# Patient Record
Sex: MALE | Race: WHITE | NOT HISPANIC OR LATINO | Employment: FULL TIME | URBAN - METROPOLITAN AREA
[De-identification: names, ages, dates, MRNs, and addresses within clinical notes are randomized per-mention and may not be internally consistent; named-entity substitution may affect disease eponyms.]

---

## 2021-09-15 LAB — SARS-COV-2 RNA RESP QL NAA+PROBE: NEGATIVE

## 2021-09-15 PROCEDURE — 87635 SARS-COV-2 COVID-19 AMP PRB: CPT | Performed by: EMERGENCY MEDICINE

## 2021-09-15 PROCEDURE — 99285 EMERGENCY DEPT VISIT HI MDM: CPT | Mod: 25

## 2021-09-15 PROCEDURE — C9803 HOPD COVID-19 SPEC COLLECT: HCPCS

## 2021-09-15 PROCEDURE — 90791 PSYCH DIAGNOSTIC EVALUATION: CPT

## 2021-09-16 ENCOUNTER — HOSPITAL ENCOUNTER (OUTPATIENT)
Facility: CLINIC | Age: 51
Setting detail: OBSERVATION
Discharge: HOME | End: 2021-09-16
Attending: EMERGENCY MEDICINE | Admitting: PSYCHIATRY & NEUROLOGY
Payer: COMMERCIAL

## 2021-09-16 VITALS
SYSTOLIC BLOOD PRESSURE: 118 MMHG | HEIGHT: 71 IN | OXYGEN SATURATION: 96 % | WEIGHT: 180.3 LBS | RESPIRATION RATE: 18 BRPM | DIASTOLIC BLOOD PRESSURE: 77 MMHG | TEMPERATURE: 98.2 F | HEART RATE: 62 BPM | BODY MASS INDEX: 25.24 KG/M2

## 2021-09-16 DIAGNOSIS — F51.01 PRIMARY INSOMNIA: ICD-10-CM

## 2021-09-16 DIAGNOSIS — F41.1 GAD (GENERALIZED ANXIETY DISORDER): ICD-10-CM

## 2021-09-16 LAB
CPB POCT: NO
GLUCOSE BLDC GLUCOMTR-MCNC: 105 MG/DL (ref 70–99)
HCO3 BLDV-SCNC: 30 MMOL/L (ref 21–28)
HCT VFR BLD CALC: 43 % (ref 40–53)
HGB BLD-MCNC: 14.6 G/DL (ref 13.3–17.7)
PCO2 BLDV: 50 MM HG (ref 40–50)
PH BLDV: 7.38 [PH] (ref 7.32–7.43)
PO2 BLDV: 23 MM HG (ref 25–47)
POTASSIUM BLD-SCNC: 3.8 MMOL/L (ref 3.4–5.3)
SAO2 % BLDV: 37 % (ref 94–100)
SODIUM BLD-SCNC: 141 MMOL/L (ref 133–144)

## 2021-09-16 PROCEDURE — G0378 HOSPITAL OBSERVATION PER HR: HCPCS

## 2021-09-16 PROCEDURE — 250N000013 HC RX MED GY IP 250 OP 250 PS 637: Performed by: PSYCHIATRY & NEUROLOGY

## 2021-09-16 PROCEDURE — 99236 HOSP IP/OBS SAME DATE HI 85: CPT | Performed by: PSYCHIATRY & NEUROLOGY

## 2021-09-16 PROCEDURE — 82803 BLOOD GASES ANY COMBINATION: CPT

## 2021-09-16 RX ORDER — ACETAMINOPHEN 500 MG
500 TABLET ORAL EVERY 6 HOURS PRN
Status: DISCONTINUED | OUTPATIENT
Start: 2021-09-16 | End: 2021-09-16 | Stop reason: HOSPADM

## 2021-09-16 RX ORDER — TRAZODONE HYDROCHLORIDE 100 MG/1
100 TABLET ORAL
Status: DISCONTINUED | OUTPATIENT
Start: 2021-09-16 | End: 2021-09-16 | Stop reason: HOSPADM

## 2021-09-16 RX ORDER — HYDROXYZINE PAMOATE 25 MG/1
25-50 CAPSULE ORAL 3 TIMES DAILY PRN
Qty: 30 CAPSULE | Refills: 0 | Status: SHIPPED | OUTPATIENT
Start: 2021-09-16

## 2021-09-16 RX ORDER — ESCITALOPRAM OXALATE 5 MG/1
5 TABLET ORAL DAILY
Status: DISCONTINUED | OUTPATIENT
Start: 2021-09-16 | End: 2021-09-16 | Stop reason: HOSPADM

## 2021-09-16 RX ORDER — ESCITALOPRAM OXALATE 10 MG/1
10 TABLET ORAL DAILY
Qty: 30 TABLET | Refills: 0 | Status: SHIPPED | OUTPATIENT
Start: 2021-09-16

## 2021-09-16 RX ORDER — HYDROXYZINE HYDROCHLORIDE 25 MG/1
25 TABLET, FILM COATED ORAL 3 TIMES DAILY PRN
Status: DISCONTINUED | OUTPATIENT
Start: 2021-09-16 | End: 2021-09-16 | Stop reason: HOSPADM

## 2021-09-16 RX ORDER — OLANZAPINE 5 MG/1
5 TABLET ORAL EVERY 6 HOURS PRN
Status: DISCONTINUED | OUTPATIENT
Start: 2021-09-16 | End: 2021-09-16 | Stop reason: HOSPADM

## 2021-09-16 RX ORDER — TRAZODONE HYDROCHLORIDE 50 MG/1
50 TABLET, FILM COATED ORAL
Qty: 30 TABLET | Refills: 0 | Status: SHIPPED | OUTPATIENT
Start: 2021-09-16

## 2021-09-16 RX ORDER — HYDROXYZINE HYDROCHLORIDE 50 MG/1
50 TABLET, FILM COATED ORAL 3 TIMES DAILY PRN
Status: DISCONTINUED | OUTPATIENT
Start: 2021-09-16 | End: 2021-09-16

## 2021-09-16 RX ADMIN — ESCITALOPRAM 5 MG: 5 TABLET, FILM COATED ORAL at 13:12

## 2021-09-16 RX ADMIN — HYDROXYZINE HYDROCHLORIDE 50 MG: 50 TABLET, FILM COATED ORAL at 01:28

## 2021-09-16 RX ADMIN — TRAZODONE HYDROCHLORIDE 100 MG: 100 TABLET ORAL at 01:28

## 2021-09-16 RX ADMIN — HYDROXYZINE HYDROCHLORIDE 25 MG: 25 TABLET ORAL at 13:12

## 2021-09-16 ASSESSMENT — ENCOUNTER SYMPTOMS
ABDOMINAL PAIN: 0
AGITATION: 1
NERVOUS/ANXIOUS: 1
VOMITING: 0
NUMBNESS: 1
COUGH: 0
FEVER: 0

## 2021-09-16 ASSESSMENT — ACTIVITIES OF DAILY LIVING (ADL)
LAUNDRY: WITH SUPERVISION
ORAL_HYGIENE: INDEPENDENT
HYGIENE/GROOMING: INDEPENDENT
DRESS: SCRUBS (BEHAVIORAL HEALTH)

## 2021-09-16 ASSESSMENT — MIFFLIN-ST. JEOR: SCORE: 1689.97

## 2021-09-16 NOTE — ED NOTES
"Collateral Information:    Dionne Archibald- [phone]    Spoke to Dionne, patient's adult daughter, who accompanied the patient in the ED.  Dionne states the patient has been experiencing increasing and debilitating anxiety with intrusive thoughts with SI.  Dionne reported the patient has told her he has thoughts of \"jerking his car off of the road\".  She states his increased anxiety is pervasive throughout his personal and work life.  Dionne describes this as the patient avoiding certain social situations \"he won't eat or go to the break room at work anymore because he is nervous he will say something crazy\" and \"my parents will have friends over for a BBQ and he will freak out and bolt without saying anything to anyone\".  Dionne reports these symptoms have have worsened and the patient obsesses about the intrusive thoughts \"all day, everyday\".  Dionne says the patient seems \"not mentally present\", he will say to her \"I'm lost. I feel very lost\".  Dionne also reported the patient has been experiencing sleep disturbances and feeling shaky with numbness in his hands, fingertips, and feet.  Dionne states her parents are , her mother is very cold to the patient and disregards his mental health concerns.  Dionne states her mother drinks often at night and is unable to assist or support the patient.  Dionne asked for a call back with an update later this morning, she is available and willing to  the patient around 11am.        Arti Powell, [facility]  [facility] Unit       "

## 2021-09-16 NOTE — PROGRESS NOTES
Patient agreed to discharge plan. Discharge instructions reviewed with patient including follow-up care plan. Medications: to be picked up at pharmacy Reviewed safety plan and outpatient resources. Denies SI and HI. All belongings that were brought into the hospital have been returned to patient. Escorted off the unit at 1340 accompanied by [facility] staff. Discharged to home via car with wife.

## 2021-09-16 NOTE — ED TRIAGE NOTES
Always had social anxiety, but 2 years ago, started feeling numb to everything and much more anxiety toward everything: hard to feel happy and/or excited.  His daughter states, when they go out to eat, he will often run from the table.  She states this has been an obsession, now, where he thinks about being anxious all day.  He states when he is driving, he has the urge to pull the wheel to make the car go off the road.  States he has SI thoughts sometimes, but he states he is not suicidal.

## 2021-09-16 NOTE — PROGRESS NOTES
Pt a/o c/o anxiety, and numbness that comes and goes in rt leg and foot. Has had it foe 2 wks did tell er drs about it and Psy was notified.Denies suicide ideation or harm to others.

## 2021-09-16 NOTE — ED PROVIDER NOTES
"  History     Chief Complaint:  Tingling and Anxiety      HPI   Calvin Archibald is a 51 year old male who has a history of anxiety presents with anxiety and tingling. His anxiety has significantly begun to worsen within the last month. He states that he has lost touch with himself, and that it is hard for him to feel happy or excited. He also has numbness, tingling and weakness.  He states that he has panic attacks that cause him to leave work, and make it unable for him to drive. He denies any specific thoughts of suicide, but wants his symptoms to stop. He has no cough, fever, or vomiting.  He does not currently see any therapists or psychiarists. He denies the use of tobacco and alcohol      Review of Systems   Constitutional: Negative for fever.   Respiratory: Negative for cough.    Cardiovascular: Negative for chest pain.   Gastrointestinal: Negative for abdominal pain and vomiting.   Neurological: Positive for numbness.   Psychiatric/Behavioral: Positive for agitation. The patient is nervous/anxious.    All other systems reviewed and are negative.      Allergies:  No Known Allergies        Past Medical History:    Elevated blood sugar  Hypertriglyceridemia      Social History:  Patient presents to the ED with daughter  Patient denies tobacco and alcohol use    Physical Exam     Patient Vitals for the past 24 hrs:   BP Temp Temp src Pulse Resp SpO2 Height Weight   09/16/21 0106 133/86 97.7  F (36.5  C) Oral 88 18 97 % -- --   09/16/21 0104 -- -- -- -- -- -- 1.803 m (5' 11\") 81.8 kg (180 lb 4.8 oz)   09/15/21 2218 (!) 141/82 97.7  F (36.5  C) Temporal 79 16 100 % -- --       Physical Exam  General: Appears well-developed and well-nourished.   Head: No signs of trauma.   CV: Normal rate and regular rhythm.    Resp: Effort normal and breath sounds normal. No respiratory distress.   GI: Soft. There is no tenderness.  No rebound or guarding.  Normal bowel sounds.    MSK: Normal range of motion. no edema. No Calf " tenderness.  Neuro: The patient is alert and oriented to person, place, and time.  PERRLA, EOMI, strength in upper/lower extremities normal and symmetrical. Sensation normal. Speech normal.  Skin: Skin is warm and dry. No rash noted.       Emergency Department Course     Laboratory:  Asymptomatic COVID-19 PCR test: Negative   Glucose by meter(Collection time 0049): 105 H  iStat Gases Electrolytes Venous: Bicarbonate 30 H, pO2 23 L, O2 saturation 37 L     Emergency Department Course:    Reviewed:  I reviewed nursing notes, vitals, past history and care everywhere    Assessments:  1219 I obtained history and examined the patient as noted above.     [facility]:  [facility]    Impression & Plan      Medical Decision Making:  Calvin Archibald is a 51 year old male who presents due to feeling anxiety. He states that he has been dealing with a degree of anxiety for some time, but it has gradually become worse and worse, to the point that it is making it difficult for him to function. The patient's daughter brought him into the hospital this evening.  Patient reports that he has been getting a tingling sensation throughout his body, particularly when he becomes anxious recently which has made him more concerned. On my evaluation he did appear somewhat anxious but was calm and cooperative.  The  remainder of his physical exam was reassuring.  I did obtain electrolytes and glucose which were appropriate.  Covid test was negative.  Patient was medically cleared and transferred to the [facility] unit for further psychiatric evaluation.      Covid-19  Calvin Archibald was evaluated during a global COVID-19 pandemic, which necessitated consideration that the patient might be at risk for infection with the SARS-CoV-2 virus that causes COVID-19.   Applicable protocols for evaluation were followed during the patient's care.   COVID-19 was considered as part of the patient's evaluation. The plan for testing is:  a test was  obtained during this visit. The test result was negative.    Diagnosis:    ICD-10-CM    1. SANDEE (generalized anxiety disorder)  F41.1        Scribe Disclosure:  Ilana MENDZE Hired, am serving as a scribe at 12:18 AM on 9/16/2021 to document services personally performed by Royer Brito MD based on my observations and the provider's statements to me.      Royer Brito MD  09/16/21 0549

## 2021-09-16 NOTE — ED NOTES
6813 - [id number], Met with Calvin to discuss safety plan, issues concerns and next step.  He is interested in getting started with therapy, though no therapy appointments close to home, we will check with the transition clinic.  He did want medication management, which was set up with [facility].  He is feeling safe and ready to discharge to home, though would like a dose of something to help him get home.          If I am feeling unsafe or I am in a crisis, I will:   Contact my established care providers   Call the National Suicide Prevention Lifeline: [phone]   Go to the nearest emergency room   Call 006          Warning signs that I or other people might notice when a crisis is developing for me:  He will let people know he is feeling that way, excessively withdrawn, avoiding contact     Things I am able to do on my own to cope or help me feel better:  Try to talk to someone, read, do some breathing, taking his medication, exercise - bike, workout    Things that I am able to do with others to cope or help me better:   Go for a walk, go out for coffee, talk/communicate     Things I can use or do for distraction:  Yard work, meditation, yoga    Changes I can make to support my mental health and wellness:  Taking your medication, try to get enough sleep, eat a healthy diet     People in my life that I can ask for help:  Daughter, friends and family he can talk to    Your Critical access hospital has a mental health crisis team you can call 24/7:  [facility] Crisis Line Number: [phone]  Text: [phone]    Dolly Lane MA, LMFT

## 2021-09-16 NOTE — CONSULTS
"9/15/2021  Calvin Archibald 1970     [facility] Mental Health Assessment:    Started at: 0700  Completed at: 0740  What type of assessment are you doing today? Crisis assessment    1.  Presenting Problem:      Referral Method to ED? Family/Friends     What brings the patient to the ED today? Pt is brought to ED by his adult daughter due to increased symptoms of anxiety. Pt has dealt with social anxiety most of his life and his anxiety has increased to other areas in the past 3 years with the past 3 months being most acute. The last month, the pt has been unable to go to work some days or needed to leave due to feelings of doom, being trapped and claustrophobia.     Pt also reports episodes of issues with balance about 3 times per week, numbness and tingling in his hands, arms and legs. He also reports involuntary tremors in his hands, dropping bolts and other items at work and difficulty finding words or expressing his thoughts. He is unable to distinguish if these things are more likely to happen when anxious or not as he feels he is always anxious at this time.     The pt has experienced intrusive thoughts over the past 3 years. He sometimes has thoughts of racially derogatory remarks and states he is not racist, his boss is black and he is terrified one of these thoughts will \"come out.\" He has also had thoughts of hitting a person while talking to them. No one in particular, just random people he may be talking to. He also has had thoughts of jerking the steering wheel on his car, he states this is not a suicidal thought, just intrusive and scary. Pt denies SI or any previous attempts but acknowledges feeling like he can not live like this. Pt denies HI/AH/VH.     Pt reports he has always had sleep issues which are more acute now. He can fall asleep but can not stay asleep, waking 2 - 4 hours into his sleep cycle with a startled and anxious feeling. He often has extreme night sweats when this happens. The pt has no " appetite issues, he enjoys food and reports no problems there.     The pt is open to medication and/or therapy depending on what his insurance will cover. He has been trying HTP supplement (amino acids for anxiety) over the past week with some, but very little success.     Has this happened before? Yes Pt has experienced Pt has expereinced social anxiety most of his life with it becoming owrse in the past 3 years. The past 2 -3 months has become more acute and his anxiety is a barrier to his functioning.     Duration of presenting problem: Current level of anxiety has been present for 2 -3 months with the past month being most acute.     Additional Stressors: Pt's wife is using alcohol to excess to deal with her own mental health.     2.  Risk Assessment:  Suicide and Self-Harm    ESS-6  1.a. Over the past 2 weeks, have you had thoughts of killing yourself? No   1.b. Have you ever attempted to kill yourself and, if yes, when did this last happen? No  2. Recent or current suicide plan? No  3. Recent or current intent to act on ideation? No  4. Lifetime psychiatric hospitalization? No  5. Pattern of excessive substance use? Yes  6. Current irritability, agitation, or aggression? No  ESS-6 Score: 1, low    SI: Passive  Plan: No  Intent: No   Prior Attempts: No     Protective Factors: Pt reports that his 4 children, 1 grandchild and his wife are all protective factors.     Hopes and goals for the future: Pt wants to enjoy watching his grandchild grow up and get back to enjoying previous things that brought him happiness in the past like watch his sons play sports and spend time with friends.     Coping Skills: What helps and doesn't help? Pt has used xanax in the recent past to address his anxiety. Historically he has used alcohol (3-5 drinks per day, most days) to help relax. He has not drank in the past week as he has been taking an amino acid supplement to address his symptoms.     Additional Risk Factors Related to  Safety and Suicide: No    Is the patient engaged in self injurious behaviors? No     Risk to Others    Aggressive/Assaultive/Homicidal Risk Factors: No     Duty to Warn? No     Was a Child Protection Report Made? No       Was a Adult Protection Report Made? No        Sexually inappropriate behavior? No        Vulnerability to sexual exploitation? No     Additional information:  Pt has never taken prescribed medication or therapy in the past.     3. Mental Health Symptoms and Substance Use  Current Symptoms and Mental Health History    GAIN Short Screener (GAIN-SS) administered? NA    Attention, Hyperactivity, and Impulsivity Symptoms      Patient reported symptoms related to hyperactivity, inattention, or impulsivity? No       Anxiety Symptoms    Patient reported anxiety symptoms? Yes: Panic attacks and Generalized Symptoms: Avoidance, Cognitive anxiety - feelings of doom, racing thoughts, difficulty concentrating , Excessive worry and Physiological anxiety - sweating, flushing, shaking, shortness of breath, or racing heart         Behavioral Difficulties    Patient reported behavioral difficulties? Yes: Withdrawal/Isolation       Mood Symptoms    Patient reported mood disorder symptoms? Yes: Feelings of helplessness , Feelings of hopelessness , Isolative , Loss of interest / Anhedonia , Low self esteem , Psychomotor agitation or retardation, Sad, depressed mood , Sleep disturbance , Somatic complaints and Thoughts of suicide/death        Eating Disorders and Appetite Disturbance      Patient reported appetite symptoms? No       Interpersonal Functioning     Patient reported difficulties that may be associated with personality and interpersonal functioning? Yes: Impaired Interpersonal Functioning      Learning Disabilities/Cognitive/Developmental Disorders    Patient reported concerns related to learning disabilities, cognitive challenges, and/or developmental disorders? No       General Cognitive  Impairments    Patient reported symptoms of cognitive impairments? No  If yes, complete Mini-Cog Assessment.    Sleep Disturbance    Patient reported difficulties with sleep? Yes: Difficulty staying sleep         Psychosis Symptoms    Patient reported symptoms of psychosis? No        Trauma and Post-Traumatic Stress Disorder    Physical Abuse: No   Emotional/Psychological Abuse: No  Sexual Abuse: No  Loss of a friend or family member to suicide: No  Other Traumatic Event: No     Patient reported trauma related symptoms? No       Impact of Mental Health on Functioning      Negative Impact Score: 8/10   Subjective Impact on functioning: Pt has been unable to work some days, is unable to enjoy his usual activities and can not participate in group setting. (i.e. lunchroom at work)  How do symptoms vary from baseline? While pt has experienced anxiety for many years, he has never been unable to function at work or with family.     Current and Historical Substance Use Note:    IIs there a history of, or current, substance use? Yes: Substance #1: Name(s): alcohol Frequency: most days Quantity: 3 - 5 drinks Duration: years Last use: 1 week ago      Have you been to chemical dependency treatment or detox before? No     CAGE-AID    Have you felt you ought to cut down on your drinking or drug use? No     Have people annoyed you by criticizing your drinking or drug use? No   Have you felt bad or guilty about your drinking or drug use? No  Have you ever had a drink or used drugs first thing in the morning to steady your nerves or to get rid of a hangover? No   CAGE-AID Score: 0/4    Drug screen completed? No   BAL/Breathalyzer completed? No       Mental Status Exam:    Affect: Blunted  Appearance: Appropriate   Attention Span/Concentration: Attentive    Eye Contact: Engaged  Fund of Knowledge: Appropriate   Language /Speech Content: Fluent  Language /Speech Volume: Normal   Language /Speech Rate/Productions: Slow   Recent Memory:  Intact  Remote Memory: Intact  Mood: Anxious and Sad   Orientation:   Person: Yes   Place: Yes  Time of Day: Yes   Date: Yes   Situation (Do they understand why they are here?): Yes   Psychomotor Behavior: Normal   Thought Content: Clear  Thought Form: Obsessive/Perseverative    4. Social and Environmental Conditions   Is the patient their own guardian? Yes    Living Situation: With others: wife, one adult son is sometimes in the home (20 y.o.)    Support system and quality of connections: Pt reports that his daughter is is closest support. She brought him here and gave collateral.     Income source: Employment: Works in [employer]    Issues with employment or education: Yes Pt has been unable to attend or needed to unexpectedly leave work due to his symptoms.     Legal Concerns  Do you have any history of or current involvement with the legal system? No    Spiritual and Cultural Influences  Do you have any Quaker beliefs that are important in your life? No     Do you have any cultural influences in your life that impact your mental health care? No        5. Psychiatric History, Medical History, and Current Care      Patient Mental Health Services   Does the patient have a history of mental health concerns/diagnoses? Yes Pt reports anxiety for many years but has not sought formal treatment in the past.      Current Providers  Primary Care Provider: Unknown   Psychiatrist: No   Therapist: No   : No   [facility]: No   ACT Team: No   Other: No    History of Commitment? No  History of Psychiatric Hospitalizations? No   History of programmatic care? No    Family Mental Health History   Family History of Mental Health or Chemical Dependency Issues? No     Development and Physical Health Challenges  Delays or concerns meeting developmental milestones? No  Current psychotropic medications? No   Medication Compliant? NA   Recent medication changes? NA    History of concussion or TBI? No     Additional  "Information: N/A    6. Collateral Information and Collaboration    Collaboration with medical staff:Referral Information:   Nursing     Collateral Information/Sources: Wife, Dayna [phone]     Family: Meagan Archibald- [phone]     Spoke to Dionne, patient's adult daughter, who accompanied the patient in the ED.  Dionne states the patient has been experiencing increasing and debilitating anxiety with intrusive thoughts with SI.  Dionne reported the patient has told her he has thoughts of \"jerking his car off of the road\".  She states his increased anxiety is pervasive throughout his personal and work life.  Dionne describes this as the patient avoiding certain social situations \"he won't eat or go to the break room at work anymore because he is nervous he will say something crazy\" and \"my parents will have friends over for a BBQ and he will freak out and bolt without saying anything to anyone\".  Dionne reports these symptoms have have worsened and the patient obsesses about the intrusive thoughts \"all day, everyday\".  Dionne says the patient seems \"not mentally present\", he will say to her \"I'm lost. I feel very lost\".  Dionne also reported the patient has been experiencing sleep disturbances and feeling shaky with numbness in his hands, fingertips, and feet.  Dionne states her parents are , her mother is very cold to the patient and disregards his mental health concerns.  Dionne states her mother drinks often at night and is unable to assist or support the patient.  Dionne asked for a call back with an update later this morning, she is available and willing to  the patient around 11am.          Arti Powell, [facility]  [facility] Unit         7. Assessment and Diagnosis  Assessment of patient strengths and vulnerabilities    Strengths, Protective Factors, & Community Resources: Pt finds his daughter to be a very supportive factor in his life. Pt normally finds watching sports to be an effective distraction " but that is not the case currently. Pt is employed and has insurance, he also is open to treatment by medication and/or therapy.     Patient skills, abilities, and coping skills (what is going well?): Pt is unable to cope with symptoms currently.     Patient vulnerabilities: Pt has engaged in alcohol use in the past to deal with his anxiety. Pt is unable to cope with intrusive thoughts of racial remarks, driving car of the road or hitting others when speaking to them. He has not acted upon these things and does not want to but finds the thoughts debilitating.     Diagnosis      1 Generalized anxiety disorder F41.1      2 Major depressive disorder, Recurrent episode, Moderate F33.1      3 Alcohol use disorder, Mild    8.Therapeutic Methodologies Utilized in Assessment    Psychotherapy techniques and/or interventions used: Establishing rapport, Active listening, Assess dimensions of crisis and Brief Supportive Therapy    9. Patient Care/Treatment Plan  Summary of Patient Presentation and needs  What are the basic needs for this patient in this moment? Pt needs to find ways to address his anxiety symptoms that are barriers to his daily functioning.       Consultations :  Attending provider consulted? Yes  Attending Name: Dr Tomas Fletcher   Attending concurs with disposition? Yes     Recommended disposition: Medications will be started and pt should follow up with PCP, which he needs to establish.     Does the patient agree with the recommended level of care? Yes    Final disposition: Other: Pending    Disposition Details: Pending    If Inpatient, is patient admitted voluntary? N/A   Patient aware of potential for transfer if there is not appropriate placement? NA  [facility]   Central Intake Notified? NA    10. Patient Care Document: Safety and After Care Planning:          Safety Plan Provided? No, safety plan will be created prior to discharge.    Follow-Up  Plans and Providers: Pending    Follow-Up Plan:  After care plan provided to the patient/guardian by: Pending  After care plan provided to any additional sources/parties? No    Duration of face to face time with patient in minutes: .75 hrs    CPT code(s) utilized: 67272 - Psychotherapy for Crisis - 60 (30-74*) min      Mervat Lopez, LICSW

## 2021-09-16 NOTE — PROGRESS NOTES
Per ED notes: Always had social anxiety, but 2 years ago, started feeling numb to everything and much more anxiety toward everything: hard to feel happy and/or excited.  His daughter states, when they go out to eat, he will often run from the table.  She states this has been an obsession, now, where he thinks about being anxious all day.  He states when he is driving, he has the urge to pull the wheel to make the car go off the road.  States he has SI thoughts sometimes, but he states he is not suicidal. Upon Arrival to [facility] pt is visibly anxious. Pt is calm and cooperative. Pt requested some medication to help him with his anxiety. Pt received  50mg Atarax.

## 2021-09-16 NOTE — ED PROVIDER NOTES
EmPATH Unit - Psychiatry  Combined Observation Note and Discharge Summary  [facility] Emergency Department  Observation Initiation Date: Sep 15, 2021    Calvin Archibald MRN: [id number]   Age: 51 year old YOB: 1970     History     Chief Complaint   Patient presents with     Tingling     Anxiety     HPI  Calvin Archibald is a 51 year old male who presents to the emergency room seeking a physical and mental health evaluation.  Records indicate a prominent concern for symptoms of anxiety as well as somatic aberrations involving numbness, tingling, and occasional weakness.  Once determined to be medically stable, he was transferred to the empath unit for psychiatric assessment.  On examination, the patient explains that over the past few weeks, he has been experiencing progressively worsening anxiety.  This is characterized as excessive worries leading to moments of shakiness, shortness of breath, palpitations, and weakness.  His sleep is overall decreased, noting early morning wakings and difficulty falling back asleep.  Concentration has been limited.  His energy has been declining.  His appetite has been consistent and his weight has remained stable.  He denied suicidal or homicidal thoughts.  No indication of psychosis.  No correlation with alcohol or substance usage.  He explains that he was seeking reassurance of medical stability as well as wanting to explore mental health related treatments.  He has been taking an over-the-counter supplement to aid with the symptoms however has not been gaining sustained or meaningful benefit.  He recalls one daughter that is also being treated for anxiety however he is not certain of what medication she has taken or whether or not she is still taking the medication.  No other family history of mental illness reported.      Past Medical History  History reviewed. No pertinent past medical history.  History reviewed. No pertinent surgical history.  escitalopram (LEXAPRO) 10  "MG tablet  hydrOXYzine (VISTARIL) 25 MG capsule  traZODone (DESYREL) 50 MG tablet      No Known Allergies  Family History  History reviewed. No pertinent family history.  Social History   Social History     Tobacco Use     Smoking status: Former Smoker   Substance Use Topics     Alcohol use: Yes     Comment: No alcohol for a week     Drug use: Never      Past medical history, past surgical history, medications, allergies, family history, and social history were reviewed with the patient. No additional pertinent items.       Review of Systems  A complete review of systems was performed with pertinent positives and negatives noted in the HPI, and all other systems negative.    Physical Examination   BP: (!) 141/82  Pulse: 79  Temp: 97.7  F (36.5  C)  Resp: 16  Height: 180.3 cm (5' 11\")  Weight: 81.8 kg (180 lb 4.8 oz)  SpO2: 100 %    Physical Exam  General: Appears stated age.   Neuro: Alert and fully oriented. Extremities appear to demonstrate normal strength on visual inspection.   Integumentary/Skin: no rash visualized, normal color    Psychiatric Examination   Appearance: awake, alert  Attitude:  cooperative  Eye Contact:  fair  Mood:  anxious  Affect:  mood congruent  Speech:  clear, coherent  Psychomotor Behavior:  no evidence of tardive dyskinesia, dystonia, or tics  Thought Process:  logical and linear  Associations:  no loose associations  Thought Content:  no evidence of suicidal ideation or homicidal ideation and no evidence of psychotic thought  Insight:  fair  Judgement:  intact  Oriented to:  time, person, and place  Attention Span and Concentration:  intact  Recent and Remote Memory:  intact  Language: able to name/identify objects without impairment  Fund of Knowledge: intact with awareness of current and past events    ED Course        Labs Ordered and Resulted from Time of ED Arrival Up to the Time of Departure from the ED   ISTAT GASES ELECTROLYTES VENOUS POCT - Abnormal; Notable for the following " components:       Result Value    Bicarbonate Venous POCT 30 (*)     pO2 Venous POCT 23 (*)     O2 Sat, Venous POCT 37 (*)     All other components within normal limits   GLUCOSE BY METER - Abnormal; Notable for the following components:    GLUCOSE BY METER POCT 105 (*)     All other components within normal limits   COVID-19 VIRUS (CORONAVIRUS) BY PCR - Normal    Narrative:     Testing was performed using the [employer]  SARS-CoV-2 & Influenza A/B Assay on the [employer]  L[employer]  System.  This test should be ordered for the detection of SARS-COV-2 in individuals who meet SARS-CoV-2 clinical and/or epidemiological criteria. Test performance is unknown in asymptomatic patients.  This test is for in vitro diagnostic use under the FDA EUA for laboratories certified under CLIA to perform moderate and/or high complexity testing. This test has not been FDA cleared or approved.  A negative test does not rule out the presence of PCR inhibitors in the specimen or target RNA in concentration below the limit of detection for the assay. The possibility of a false negative should be considered if the patient's recent exposure or clinical presentation suggests COVID-19.  [facility] are certified under the Clinical Laboratory Improvement Amendments of 1988 (CLIA-88) as qualified to perform moderate and/or high complexity laboratory testing.   GLUCOSE MONITOR NURSING POCT   ISTAT GAS OR ELECTROLYTE NURSING POCT   URINE DRUGS OF ABUSE SCREEN       Assessments & Plan (with Medical Decision Making)   Patient presenting with . Nursing notes reviewed noting no acute issues.     I have reviewed the assessment completed by the [facility].     During the observation period, the patient did not require medications for agitation, and did not require restraints/seclusion for patient and/or provider safety.    The patient was found to have a psychiatric condition that would benefit from an observation stay in the emergency department for  further psychiatric stabilization and/or coordination of a safe disposition. The observation plan includes serial assessments of psychiatric condition, potential administration of medications if indicated, further disposition pending the patient's psychiatric course during the monitoring period.     Preliminary diagnosis:    ICD-10-CM    1. SANDEE (generalized anxiety disorder)  F41.1    2. Primary insomnia  F51.01         Treatment Plan:  -Begin Lexapro 10 mg daily targeting symptoms of a generalized anxiety disorder.  Until the medication takes full effect, Vistaril will be available as needed for anxiety at a dose of 25-50 mg 3 times a day.  To aid with insomnia, trazodone will also be provided at a dose of 50 mg at bedtime as needed.  The patient was educated regarding his diagnosis and medication plan including potential side effects that may occur.  -The patient will follow-up with his primary care provider for medication management.  -Resources will be provided for individual psychotherapy  -Discharge home today    After the period in observation care, the patient's circumstances and mental state were safe for outpatient management. After counseling on the diagnosis, work-up, and treatment plan, the patient was discharged. Close follow-up with a psychiatrist and/or therapist was recommended and community psychiatric resources were provided. Patient is to return to the ED if any urgent or potentially life-threatening concerns.      At the time of discharge, the patient's acute suicide risk was determined to be low due to the following factors: Reduction in the intensity of mood/anxiety symptoms that preceded the admission, denial of suicidal thoughts, denies feeling helpless or helpless, not currently under the influence of alcohol or illicit substances, denies experiencing command hallucinations, no immediate access to firearms. The patient's acute risk could be higher if noncompliant with their treatment plan,  medications, follow-up appointments or using illicit substances or alcohol. Protective factors include: social supports, children, stable housing, employment    --  Tomas Fletcher MD   [facility] EMERGENCY DEPT  EmPATH Unit  9/15/2021         Tomas Fletcher MD  09/16/21 0945

## 2021-09-16 NOTE — DISCHARGE INSTRUCTIONS
Medication Management Appointment:  Davy Faustin  MSN  CNP,PMHNP,RN, [facility]  [address], [address] [phone] 9/29/2021 2:00 pm  Therapy Virtual Appointment:  Gopal Faustin  MSW  [facility]  [address], [address] [phone] 9/23/2021 6:00 pm  Medical Appointment:  Dr. Contreras [facility]: [address] [place] [id number] [phone] 9/27/2021 11:20 am  Please watch your email to complete paperwork before your appointments.  If you need to cancel for any reason, please call.        If I am feeling unsafe or I am in a crisis, I will:   Contact my established care providers   Call the National Suicide Prevention Lifeline: 627.532.5216   Go to the nearest emergency room   Call [phone]        Warning signs that I or other people might notice when a crisis is developing for me:  He will let people know he is feeling that way, excessively withdrawn, avoiding contact      Things I am able to do on my own to cope or help me feel better:  Try to talk to someone, read, do some breathing, taking his medication, exercise - bike, workout     Things that I am able to do with others to cope or help me better:   Go for a walk, go out for coffee, talk/communicate      Things I can use or do for distraction:  Yard work, meditation, yoga     Changes I can make to support my mental health and wellness:  Taking your medication, try to get enough sleep, eat a healthy diet      People in my life that I can ask for help:  Daughter, friends and family he can talk to     Your county has a mental health crisis team you can call 24/7:  [facility] Crisis Line Number: [phone]  Text: [place] 594219    Crisis Lines  Crisis Text Line  Text 934950  You will be connected with a trained live crisis counselor to provide support.    Gambling Hotline  1.800.333.hope [4673]    National Hope Line  1.800.SUICIDE [9887811]    National Suicide Prevention  "Lifeline  Free and confidential support  1.277.494.TALK [9351]  http://suicidepreventionlifeline.org    [employer] (LGBTQ Youth Crisis Line)  1.392.824.5908  text START to 182-054    's Crisis Line  2.327.841.3342 (Press 1)  or text 317806    [facility] Mental Health Crisis Response  Within [place], call **CRISIS [**563337] to be connected to a mental health professional who can assist you.      [facility] Crisis  [phone]    [facility] Mobile Crisis  [phone]    [facility] Crisis  [phone]    [facility] Mobile Crisis  [phone] (adults)  [phone] (children)    [facility] Crisis  [phone] (adults)  [phone] (children)    [facility] Mobile Crisis  [phone]    [facility] Mobile Crisis  [phone]    Community Resources  Fast Tracker  Linking people to mental health and substance use disorder resources  PowerPottrackMeteo Protectn.org     [place] Mental Health Warm Line  Peer to peer support  Monday thru Saturday, 12 pm to 10 pm  495.980.3678 or 8.032.555.9947  Text \"Support\" to 49128    National [place] on Mental Illness (LUNA)  434.779.9420 or 1.888.LUNA.HELPS    [facility]  [facility] residents only   [address] [facility]  [phone]    Walk-in Counseling Center  Free mental health counseling  [address] [facility]  [phone]    Mental Health Apps  My3  https://my[employer].pp.org/    VirtualHopeBox  https://Phase [employer].org/apps/virtual-hope-box/    Suicide Safety Plan (Smart Voicemail)    Calm Harm      Why We Don't Prescribe Opioids and Benzodiazepines Together  Helping You Take Your Medicines Safely  What are the dangers of mixing opioids and benzodiazepines (BZDs)?  It's very risky to mix opioids and benzodiazepines. Both medicines can make your brain work slower. The risks of taking these medicines together include:    Feeling overly sleepy or " drowsy    Getting hurt by accident    Slowed breathing or trouble breathing    Coma    Accidental overdose    Death  If you are taking both these medicines, your provider will safely taper you off one of them. We will help you find other, safer medicines.  How can I make taking these drugs safer?    Don't drink alcohol while taking these medicines. Wine, beer and liquor can raise your risk of deadly side effects.    Don't drive or use heavy machinery until you know how these medicines affect you.    Tell your healthcare providers about all the medicines you're taking. Include over-the-counter (OTC) medicines, like cold and allergy pills.    Keep a list of all your medicines where you can find it quickly.  What are opioids?  Opioids (QR-ypz-nfzjp) are powerful medicines for pain. They can cause problems even when you use them right. Using opioids also increases your risk of injury, addiction, overdose and death.   Examples of opioids    Hydrocodone (Vicodin, Norco, Lortab)    Oxycodone (Percocet)    Morphine    Fentanyl    Methadone    Tramadol    Buprenorphine  Common side effects of opioids    Feeling drowsy or dizzy    Upset stomach (nausea)    Throwing up (vomiting)    Hard stools (constipation)    Mood problems    Slowed breathing or trouble breathing  What are benzodiazepines?  Benzodiazepines (carolina-zoh-die-AZ-uh-peenz), or BZDs, are used to treat seizures, muscle spasm, anxiety and sleeplessness (insomnia). BZDs can cause many problems, including drug abuse.   Examples of benzodiazepines    Alprazolam (Xanax)    Clonazepam (Klonopin)    Diazepam (Valium)    Lorazepam (Ativan)    Temazepam (Restoril)    Zolpidem (Ambien)  Common side effects of benzodiazepines    Feeling drowsy or dizzy    Weakness    Trouble thinking    Mood problems  Signs of an overdose  Call 911 right away for any of these symptoms:    Face is very pale or feels clammy    Body is limp    Fingernails or lips look blue or purple    Throwing up  "or making gurgling noises    Won't wake up    Can't talk    Breathing or heartbeat is slow or stopped   What can I do to prevent an overdose?  1. Only take medicine prescribed to you by your doctor.  2. Take your medicine exactly as prescribed. Don't take more medicine, and don't take it more often than your doctor said to.  3. NEVER mix pain medicines with alcohol, sleeping pills or other drugs.  4. Store medicines in a safe place where children and pets can't reach them.  5. Ask your pharmacist the right way to get rid of unused medicines.  6. Learn about the signs and symptoms of overdose. Overdose is a life-or-death emergency.  7. Ask your provider about using naloxone.  What is naloxone and how can it help me?  Naloxone (nuh-LOX-ohn) is a very important medicine that can make it safer to use opioids.   Naloxone can reverse the effects of an opioid overdose. But you need to take it the right way at the right time.  Naloxone won't treat benzodiazepine overdose. But naloxone can help if opioids were taken together with BZDs, sleeping pills, or stimulants (\"uppers\").  Ask your provider about naloxone if you are taking opioids.     For informational purposes only. Not to replace the advice of your health care provider. Copyright   2017 [facility]. All rights reserved. [employer] 433023 - 03/17.      Treating Anxiety Disorders with Therapy    If you have an anxiety disorder, you don t have to suffer needlessly. Treatment is available. Therapy (also called counseling) is often a helpful treatment for anxiety disorders. With therapy, a trained professional (therapist) helps you face and learn to manage your anxiety. Therapy can be short-term or long-term based on your needs. In some cases, medicine may also be prescribed with therapy. It may take time before you notice how much therapy is helping, but stick with it. With therapy, you can feel better.   Cognitive behavioral therapy " (CBT)  Cognitive behavioral therapy (CBT) teaches you to manage anxiety. It does this by helping you understand how you think and act when you re anxious. Research has shown CBT to be a very effective treatment for anxiety disorders. CBT involves homework and activities to build skills that teach you to cope with anxiety step by step. It can be done in a group or 1-on-1, and often takes place for a set number of sessions. CBT has 2 main parts:     Cognitive therapy. This helps you identify the negative, irrational thoughts that occur with your anxiety. You ll learn to replace these with more positive, realistic thoughts.    Behavioral therapy. This helps you change how you react to anxiety. You ll learn coping skills and methods for relaxing to help you better deal with anxiety.  Other forms of therapy  Other therapy methods may work better for you than CBT. Or, you may move from CBT to another form of therapy as your treatment needs change. This may mean meeting with a therapist by yourself or in a group. Therapy can also help you work through problems in your life, such as drug or alcohol dependence, that may be making your anxiety worse.   Getting better takes time  Therapy will help you feel better and teach you skills to help manage anxiety long term. But change doesn t happen right away. It takes a commitment from you. And treatment only works if you learn to face the causes of your anxiety. So, you might feel worse before you feel better. This can sometimes make it hard to stick with it. But remember: Therapy is a very effective treatment. The results will be well worth it.   Helping yourself  If anxiety is wearing you down, here are some things you can do to cope:    Check with your healthcare provider and rule out any physical problems that may be causing the anxiety symptoms.    If you are diagnosed with an anxiety disorder, seek mental healthcare. This is an illness and it can respond to treatment. Most  types of anxiety disorders will respond to talk therapy and medicine.    Educate yourself about anxiety disorders. Keep track of helpful online resources and books you can use during stressful periods.    Try stress management methods such as meditation.    Consider online or in-person support groups.    Don t fight your feelings. Anxiety feeds itself. The more you worry about it, the worse it gets. Instead, try to identify what might have triggered your anxiety. Then try to put this threat in perspective.    Keep in mind that you can t control everything about a situation. Change what you can and let the rest take its course.    Exercise -- it s a great way to relieve tension and help your body feel relaxed.    Examine your life for stress, and try to find ways to reduce it.    Avoid caffeine and nicotine. These can make anxiety symptoms worse.    Don't turn to alcohol or unprescribed medicines for relief. They only make things worse in the long run.  [employer] last reviewed this educational content on 5/1/2020 2000-2021 [employer]. All rights reserved. This information is not intended as a substitute for professional medical care. Always follow your healthcare professional's instructions.          Life After Combat: Coping with an Anxiety Disorder   In combat, you were under a lot of stress. You were in a strange place. You were away from your home and family. You lived with the constant threat of danger, violence, and personal harm. You may have been asked to do things that challenged you in unexpected ways.  culture can be demanding, with long days, short nights, and little room for emotion. Being brave is standard practice. Anxiety is a healthy response to stress like this. But too much anxiety can be a problem. It may start kicking in at the wrong time. Or it may never go away. This causes physical and emotional discomfort. For some people anxiety can become so severe that it causes  problems in daily life, at work or school, and in relationships. When anxiety becomes such a big part of your life, it s an anxiety disorder.      Treatment can help you get your anxiety disorder under control. Talk to your healthcare provider about the best treatment options for you.    How anxiety helps keep you safe  Anxiety is an extreme form of worry and stress. When you re threatened, there s rarely time to decide how to react. Instead, the body s automatic defenses kick in. Your body is flooded with anxiety hormones. This causes physical and emotional responses. Your heart pumps harder. Your muscles tense. You tremble or sweat. Emotionally, you feel intense worry, fear, or dread. These sensations and emotions alert you to danger and help you react quickly. In response to a threat, anxiety prompts you to do what is needed to protect yourself.   When anxiety becomes a problem  In a war zone you re on high alert. Even if there is no immediate threat, danger can present itself at any time. You may be worried for your own safety and the safety of others. It s natural to experience anxiety in this situation. But the anxious feeling may continue after you leave the war zone. Even back at home, you may be on constant alert. Or extreme anxiety may pop up over minor issues. This unexplained anxiety makes it hard to live and enjoy your daily life. If this sounds like what s happening to you, you may have an anxiety disorder. Someone with an anxiety disorder may:     Have panic attacks. A panic attack is an abrupt, intense anxiety response. It includes extreme anxiety, severe physical symptoms (like a pounding heart or difficulty breathing), and a strong desire to escape. You may suddenly feel closed in or all alone, even if you re in an open, public place. Most panic attacks start suddenly, for no clear reason. The attack can last around 5 to 20 minutes. During an attack, you may think that you re having a heart attack,  going crazy, or dying.    Feel anxious all the time. You might worry about money, your family and friends, work, war, or the world in general. You might not even be sure what you re anxious about. Whatever the cause of your worry, you have a strong fear that the worst will happen. This generalized anxiety affects your quality of life and makes it hard to function.    Have intense anxiety in certain situations. For example, you may fear spending time in the dark or in enclosed spaces. These fears (sometimes called phobias) may relate to something that happened in combat. Or they may not. To escape the anxiety, you may try to avoid the situation that prompts it. Such avoidance can have a serious impact on your life.    Feel a strong need to act on anxiety-provoking thoughts. Sometimes an anxiety disorder makes certain unwanted thoughts invade your mind. You know the thoughts are irrational. But you still feel a compulsive need to act on them, often in unhealthy ways. For example, you may constantly check your doors to make sure they re locked. Or you might walk a perimeter around your house to make sure no one is watching. Doing so helps ease the anxiety for a short time. But it can become very disruptive to daily life.  Symptoms of anxiety disorders  Anxiety makes you feel worried and fearful. It can also cause physical symptoms. In fact, many people with anxiety disorders first go to the healthcare provider to get checked for a physical problem. Symptoms can include:     A pounding or racing heartbeat    Shortness of breath    Chest pain    Dizziness or lightheadedness    Restlessness or problems sleeping    Muscle tension, especially in the neck and shoulders    Nausea or stomach problems    Headaches    Very fast speech    Trembling    Fear    Feeling irritable or on edge all the time    Trouble concentrating  Treatment will help you get your life back   You may think that asking for help is a sign of weakness. In  fact, taking action to make your life better takes a lot of courage. With the right treatment, most people learn to manage anxiety. Your treatment may include talk therapy (counseling), a process during which you talk about your anxiety and related problems with a trained professional. Medicines may also be prescribed. For many people with anxiety disorders, treatment includes both medicine and counseling. Consider the following:     Check with your healthcare provider and rule out any physical problems that may be causing the anxiety symptoms.    If an anxiety disorder is diagnosed, get mental healthcare. This is an illness and it can respond to treatment. Most types of anxiety disorders will respond to talk therapy and medicine.    Counseling includes working with a trained professional to better understand your anxiety and learn skills to manage it. Many types of counseling have been shown to be effective treatments for anxiety disorders. In counseling, you ll likely learn new ways of responding to thoughts, feelings, and memories that make you anxious. This can improve your symptoms and help change how you react to situations that make you anxious. Counseling could be done one-on-one. Or you may have group therapy with other Vets who have been in combat.    Medicine may be prescribed. Some medicines are used only for a short time, to treat immediate symptoms. Others are taken long-term, to help improve your mood over time. At first, medicines and dosages may need to be adjusted. Tell your healthcare provider how a medicine affects you. This way you can work together to find what works best for you. Be aware that a few weeks may pass before you see a medicine s full impact on your mood. If you don t notice a change at first, you may simply need more time. But if you don t notice results after the first few weeks, tell your provider.    Educate yourself about anxiety disorders. Keep track of helpful online  resources and books you can use during stressful periods.    Try stress management methods such as meditation.    Think about joining online or in-person support groups.  Moving forward  An anxiety disorder affects your emotions, health, and life. Be assured that treatment will help you get better. Taking the first step can be hard. But once you start treatment and see how much better life can be, you ll be glad you did.   To learn more, talk with your healthcare provider or your [facility] mental health coordinator.   You can also visit:     [place] [facility] website at www.mentalhealth.[place].gov/anxiety/    Anxiety and Depression Association of [place] at adaa.org/    [employer] last reviewed this educational content on 4/1/2020 2000-2021 [employer]. All rights reserved. This information is not intended as a substitute for professional medical care. Always follow your healthcare professional's instructions.          Improving Sleep After Traumatic Brain Injury  A traumatic brain injury (TBI) is a sudden jolt to your head that causes your brain to change the way it works. This can happen from a blow to your head, a blast, a sudden movement of your head that bounces your brain inside your skull, or a bullet or fragment entering your brain. Falls, fights, combat, sports, and motor vehicle accidents are other common causes. TBI is more common in men than women and more common in those younger than 25 years old.   TBI can cause many brain changes. Because everybody s brain is different, your symptoms may be different from those of other people. Symptoms can include changes in the way you feel, act, think, and move. Having trouble sleeping is one symptom that affects many people with TBI. Studies show that about 60% of people with TBI have this problem.  Why TBI causes sleep problems   Having a TBI may cause a sleep problem for various reasons including direct trauma to  the sleep areas in the brain, changes in melatonin, a brain hormone involved in regulating sleep, and other injury types. Other problems and some common TBI symptoms also can make sleep more difficult. These include:    Abuse of drugs and alcohol    Mental health problems, such as depression and anxiety    Daytime sleepiness (because you may end up napping during the day)    Headache and other types of pain  Types of sleep problems with TBI  If you are recovering from TBI, you may:    Have trouble falling asleep    Have trouble staying asleep    Wake up often and easily (you re a  light sleeper )    Not be able to fall back asleep    Have trouble getting enough oxygen while sleeping (called sleep apnea)    Develop narcolepsy, which is falling asleep suddenly and uncontrollably     Develop sleepwalking, which is walking or moving while asleep with no awareness of the action   Why sleep is important for TBI recovery   Your brain needs sleep to recover from a TBI. Not getting enough sleep can make many other TBI symptoms worse. These symptoms include:    Fatigue    Mental confusion    Pain    Depression    Anxiety    Mood swings    Memory problems   Help for TBI sleep problems  Insomnia after TBI can persist. It may be accompanied by depression and trouble functioning during the day. If your symptoms get worse or last for more than a few weeks, reach out to your primary care healthcare provider or specialists for help. Behavioral therapy and medicines to keep you awake during the day or antidepressants are very helpful in some.  In most people, medicines that help you sleep are rarely the answer for sleep problems that a TBI causes. Many sleep medicines, including over-the-counter drugs, can make TBI worse. Don't take any sleep medicines or aids before checking with your healthcare provider.  The best way to treat TBI-related sleep problems is with what is called good sleep hygiene. That means:    Going to bed and  getting up at the same time every day, including weekends    Avoiding caffeine, alcohol, and nicotine    Getting some exercise and sunshine every day to help reset your internal clock    Resting during the day, but not napping for more than 20 minutes    Avoiding heavy exercise and heavy meals for several hours before bedtime    Keeping your bedroom quiet, dark, and at a comfortable temperature    Keep stress and work out of the bedroom     Not watching TV or working on your computer while in bed    Not lying awake in bed; get up and do a relaxing activity for a short while  Sleep problems are common after a TBI. If good sleep hygiene is not solving your sleep problems, talk with your healthcare provider. You may need to learn some relaxation techniques or try talk therapy to help you through a mental health problem like depression or anxiety.    Sleeping well is one of the best ways to help your brain recover. Do everything you can to get the rest you need.  [employer] last reviewed this educational content on 1/1/2018 2000-2021 [employer]. All rights reserved. This information is not intended as a substitute for professional medical care. Always follow your healthcare professional's instructions.          Treating Anxiety Disorders with Medicine  An anxiety disorder can make you feel nervous or apprehensive, even without a clear reason. In people age 65 and older, generalized anxiety disorder is one of the most commonly diagnosed anxiety disorders. Many times it occurs with depression. Certain anxiety disorders can cause intense feelings of fear or panic. You may even have physical symptoms such as a racing heartbeat, sweating, or dizziness. If you have these feelings, you don t have to suffer anymore. Treatment to help you overcome your fears will likely include therapy (also called counseling). Medicine may also be prescribed to help control your symptoms.     Medicines  Certain medicines may be  prescribed to help control your symptoms. So you may feel less anxious. You may also feel able to move forward with therapy. At first, medicines and dosages may need to be adjusted to find what works best for you. Try to be patient. Tell your healthcare provider how a medicine makes you feel. This way, you can work together to find the treatment that s best for you. Keep in mind that medicines can have side effects. Talk with your provider about any side effects that are bothering you. Changing the dose or type of medicine may help. Don t stop taking medicine on your own. That can cause symptoms to come back or cause dangerous withdrawal symptoms.     Anti-anxiety medicine. This medicine eases symptoms and helps you relax. Your healthcare provider will explain when and how to use it. It may be prescribed for use before situations that make you anxious. You may also be told to take medicine on a regular schedule. Anti-anxiety medicine may make you feel a little sleepy or  out of it.  Don t drive a car or operate machinery while on this medicine, until you know how it affects you.  Never use alcohol or other drugs with anti-anxiety medicines. This could result in loss of muscular control, sedation, coma, or death. Also, use only the amount of medicine prescribed for you. If you think you may have taken too much, get emergency care right away. Never share your medicines with others. Store these medicines in a safe place that can't be reached by children or visitors.   Keep taking medicines as prescribed  Never change your dosage, share or use another person's medicine, or stop taking your medicines without talking to your healthcare provider first. Keep the following in mind:     Some medicines must be taken on a schedule. Make this part of your daily routine. For instance, always take your pill before brushing your teeth. A pillbox can help you remember if you ve taken your medicine each day.    Medicines are often  taken for 6 to 12 months. Your healthcare provider will then evaluate whether you need to stay on them. Many people who have also had therapy may no longer need medicine to manage anxiety.    You may need to stop taking medicine slowly to give your body time to adjust. When it s time to stop, your healthcare provider will tell you more. Remember: Never stop taking your medicine without talking to your provider first.    If symptoms return, you may need to start taking medicines again.  This isn t your fault. It s just the nature of your anxiety disorder.  What to think about    Side effects. Medicines may cause side effects. Ask your healthcare provider or pharmacist what you can expect. They may have ideas for avoiding some side effects.    Sexual problems. Some antidepressants can affect your desire for sex or your ability to have an orgasm. A change in dosage or medicine often solves the problem. If you have a sexual side effect that concerns you, tell your healthcare provider.    Addiction. If you ve never had a problem with drugs or alcohol, you may not have a problem with medicines used to treat anxiety disorders. But always discuss the medicines with your healthcare provider before taking them. If you have a history of addiction, you may not be able to use certain medicines used to treat anxiety disorders.    Medicine interactions. Always check with your pharmacist before using any over-the-counter medicines (OTCs), including herbal supplements. Some OTCs may interact with your anti-anxiety medicines and increase or decrease their effectiveness.    Paty last reviewed this educational content on 12/1/2019 2000-2021 [employer]. All rights reserved. This information is not intended as a substitute for professional medical care. Always follow your healthcare professional's instructions.          Anxiety Reaction  Anxiety is the feeling we all get when we think something bad might happen. It  is a normal response to stress and normally causes only a mild reaction. When anxiety becomes more severe, it can interfere with daily life. In some cases, you may not even be aware of what you re anxious about. There may also be a genetic link. Or it may be a learned behavior in the home.   Both psychological and physical triggers cause stress reaction. It's often a response to fear or emotional stress, real or imagined. This stress may come from home, family, work, or social relationships.   During an anxiety reaction, you may feel:    Helpless    Nervous    Depressed    Grouchy  Your body may show signs of anxiety in many ways. You may experience:    Dry mouth    Shakiness    Dizziness    Weakness    Trouble breathing    Breathing fast (hyperventilating)    Chest pressure    Sweating    Headache    Nausea    Diarrhea    Tiredness    Inability to sleep    Sexual problems  Home care    Try to find the sources of stress in your life. They may not be obvious. These may include:  ? Daily hassles of life (such as traffic jams, missed appointments, or car troubles)  ? Major life changes, both good (new baby or job promotion) and bad (loss of job or loss of loved one)  ? Overload (feeling that you have too many responsibilities and can't take care of all of them at once)  ? Feeling helpless or feeling that your problems can't be solved    Notice how your body reacts to stress. Learn to listen to your body signals. This will help you take action before the stress becomes severe.    When you can, do something about the source of your stress. (Avoid hassles, limit the amount of change that happens in your life at one time, and take a break when you feel overloaded).    Unfortunately, many stressful situations can't be avoided. It is necessary to learn how to better manage stress. There are many proven methods that will reduce your anxiety. These include simple things such as exercise, good nutrition, and adequate rest.  Also, there are certain techniques that are helpful:  ? Relaxation  ? Breathing exercises  ? Visualization  ? Biofeedback  ? Meditation  For more information about this, talk with your healthcare provider. Or check online or at your local library or bookstore. You'll find many books and audiobooks on this subject.   Follow-up care  If you feel your anxiety is not responding to self-help measures, call your healthcare provider or make an appointment with a counselor. You may need short-term psychological counseling or medicine to help you manage stress.   Call 911  Call 911 if any of these happen:     Trouble breathing    Confusion    Drowsiness or trouble waking up    Fainting or loss of consciousness    Rapid heart rate    Seizure    New chest pain that becomes more severe, lasts longer, or spreads into your shoulder, arm, neck, jaw, or back  When to get medical advice  Call your healthcare provider right away if any of these happen:    Your symptoms get worse    Severe headache not eased by rest and mild pain reliever  Paty last reviewed this educational content on 4/1/2020 2000-2021 [employer]. All rights reserved. This information is not intended as a substitute for professional medical care. Always follow your healthcare professional's instructions.

## 2021-09-16 NOTE — ED NOTES
LMHP: Attempted to see pt, he is sleeping soundly after receiving medication and will be seen when clinically appropriate.     Mervat Lopez, CLIVESW

## 2021-09-27 ENCOUNTER — OFFICE VISIT (OUTPATIENT)
Dept: INTERNAL MEDICINE | Facility: CLINIC | Age: 51
End: 2021-09-27
Payer: COMMERCIAL

## 2021-09-27 VITALS
HEIGHT: 72 IN | TEMPERATURE: 98.7 F | WEIGHT: 184.5 LBS | HEART RATE: 82 BPM | DIASTOLIC BLOOD PRESSURE: 72 MMHG | OXYGEN SATURATION: 99 % | BODY MASS INDEX: 24.99 KG/M2 | SYSTOLIC BLOOD PRESSURE: 128 MMHG

## 2021-09-27 DIAGNOSIS — E78.5 DYSLIPIDEMIA: ICD-10-CM

## 2021-09-27 DIAGNOSIS — H69.92 DYSFUNCTION OF LEFT EUSTACHIAN TUBE: ICD-10-CM

## 2021-09-27 DIAGNOSIS — R42 DIZZINESS: ICD-10-CM

## 2021-09-27 DIAGNOSIS — Z00.00 ROUTINE GENERAL MEDICAL EXAMINATION AT A HEALTH CARE FACILITY: Primary | ICD-10-CM

## 2021-09-27 DIAGNOSIS — F41.1 GAD (GENERALIZED ANXIETY DISORDER): ICD-10-CM

## 2021-09-27 DIAGNOSIS — Z23 ENCOUNTER FOR IMMUNIZATION: ICD-10-CM

## 2021-09-27 PROBLEM — R73.9 ELEVATED BLOOD SUGAR: Status: ACTIVE | Noted: 2019-08-23

## 2021-09-27 PROBLEM — E78.1 HYPERTRIGLYCERIDEMIA: Status: ACTIVE | Noted: 2019-08-23

## 2021-09-27 PROBLEM — Z80.0 FAMILY HISTORY OF COLON CANCER IN FATHER: Status: ACTIVE | Noted: 2019-08-23

## 2021-09-27 LAB
CHOLEST SERPL-MCNC: 178 MG/DL
ERYTHROCYTE [DISTWIDTH] IN BLOOD BY AUTOMATED COUNT: 12 % (ref 10–15)
FASTING STATUS PATIENT QL REPORTED: NO
HCT VFR BLD AUTO: 43 % (ref 40–53)
HDLC SERPL-MCNC: 34 MG/DL
HGB BLD-MCNC: 14.7 G/DL (ref 13.3–17.7)
LDLC SERPL CALC-MCNC: 77 MG/DL
MCH RBC QN AUTO: 32.1 PG (ref 26.5–33)
MCHC RBC AUTO-ENTMCNC: 34.2 G/DL (ref 31.5–36.5)
MCV RBC AUTO: 94 FL (ref 78–100)
NONHDLC SERPL-MCNC: 144 MG/DL
PLATELET # BLD AUTO: 253 10E3/UL (ref 150–450)
RBC # BLD AUTO: 4.58 10E6/UL (ref 4.4–5.9)
TRIGL SERPL-MCNC: 335 MG/DL
TSH SERPL DL<=0.005 MIU/L-ACNC: 0.65 MU/L (ref 0.4–4)
WBC # BLD AUTO: 7 10E3/UL (ref 4–11)

## 2021-09-27 PROCEDURE — 36415 COLL VENOUS BLD VENIPUNCTURE: CPT | Performed by: INTERNAL MEDICINE

## 2021-09-27 PROCEDURE — 99386 PREV VISIT NEW AGE 40-64: CPT | Mod: 25 | Performed by: INTERNAL MEDICINE

## 2021-09-27 PROCEDURE — 85027 COMPLETE CBC AUTOMATED: CPT | Performed by: INTERNAL MEDICINE

## 2021-09-27 PROCEDURE — 99213 OFFICE O/P EST LOW 20 MIN: CPT | Mod: 25 | Performed by: INTERNAL MEDICINE

## 2021-09-27 PROCEDURE — 80061 LIPID PANEL: CPT | Performed by: INTERNAL MEDICINE

## 2021-09-27 PROCEDURE — 84443 ASSAY THYROID STIM HORMONE: CPT | Performed by: INTERNAL MEDICINE

## 2021-09-27 PROCEDURE — 80053 COMPREHEN METABOLIC PANEL: CPT | Performed by: INTERNAL MEDICINE

## 2021-09-27 PROCEDURE — 90471 IMMUNIZATION ADMIN: CPT | Performed by: INTERNAL MEDICINE

## 2021-09-27 PROCEDURE — 90682 RIV4 VACC RECOMBINANT DNA IM: CPT | Performed by: INTERNAL MEDICINE

## 2021-09-27 RX ORDER — FLUTICASONE PROPIONATE 50 MCG
1 SPRAY, SUSPENSION (ML) NASAL DAILY
Qty: 11.1 ML | Refills: 1 | Status: SHIPPED | OUTPATIENT
Start: 2021-09-27

## 2021-09-27 ASSESSMENT — ENCOUNTER SYMPTOMS
SHORTNESS OF BREATH: 0
NERVOUS/ANXIOUS: 0
ABDOMINAL PAIN: 0
DYSURIA: 0
DIARRHEA: 0
EYE PAIN: 0
FEVER: 0
ARTHRALGIAS: 0
CHILLS: 1
SORE THROAT: 0
JOINT SWELLING: 0
CONSTIPATION: 0
DIZZINESS: 1
HEMATOCHEZIA: 0
FREQUENCY: 0
HEMATURIA: 0
HEARTBURN: 0
MYALGIAS: 0
NAUSEA: 0
PALPITATIONS: 0
COUGH: 0
PARESTHESIAS: 0

## 2021-09-27 ASSESSMENT — PATIENT HEALTH QUESTIONNAIRE - PHQ9
SUM OF ALL RESPONSES TO PHQ QUESTIONS 1-9: 12
SUM OF ALL RESPONSES TO PHQ QUESTIONS 1-9: 12
10. IF YOU CHECKED OFF ANY PROBLEMS, HOW DIFFICULT HAVE THESE PROBLEMS MADE IT FOR YOU TO DO YOUR WORK, TAKE CARE OF THINGS AT HOME, OR GET ALONG WITH OTHER PEOPLE: VERY DIFFICULT

## 2021-09-27 ASSESSMENT — MIFFLIN-ST. JEOR: SCORE: 1716.95

## 2021-09-27 NOTE — PROGRESS NOTES
SUBJECTIVE:   CC: Calvin Archibald is an 51 year old male who presents for preventative health visit.     Patient has been advised of split billing requirements and indicates understanding: Yes     Imm/Inj  Associated symptoms include chills and congestion. Pertinent negatives include no abdominal pain, arthralgias, chest pain, coughing, fever, joint swelling, myalgias, nausea, rash or sore throat.   Healthy Habits:     Getting at least 3 servings of Calcium per day:  Yes    Bi-annual eye exam:  NO    Dental care twice a year:  NO    Sleep apnea or symptoms of sleep apnea:  Daytime drowsiness    Diet:  Regular (no restrictions)    Frequency of exercise:  1 day/week    Duration of exercise:  45-60 minutes    Taking medications regularly:  No    PHQ-2 Total Score: 4    Calvin presents today for a physical exam. We also discussed his anxiety. He tells me the next few days after his ER visit were anant but in the last few days he has felt better. He thinks the medications are kicking in. He tells me his anxiety is doing much better. He denies any current thoughts of self-harm or SI. He does endorse a history of intrusive thoughts which ramps up his anxiety. He tells me his intrusive thoughts are much milder now. He does endorse some 'disconnected dizziness' since he started the anti-anxiety medications. Taking hydroxyzine PRN but has been taking it less frequently the last few days. He tells me his left ear has also felt full.    Today's PHQ-2 Score:   PHQ-2 ( 1999 [employer]) 9/27/2021   Q1: Little interest or pleasure in doing things 3   Q2: Feeling down, depressed or hopeless 1   PHQ-2 Score 4   Q1: Little interest or pleasure in doing things Nearly every day   Q2: Feeling down, depressed or hopeless Several days   PHQ-2 Score 4     Answers for HPI/ROS submitted by the patient on 9/27/2021  If you checked off any problems, how difficult have these problems made it for you to do your work, take care of things at home, or get  along with other people?: Very difficult  PHQ9 TOTAL SCORE: 12    Abuse: Current or Past(Physical, Sexual or Emotional)- No  Do you feel safe in your environment? Yes    Social History     Tobacco Use     Smoking status: Current Some Day Smoker     Types: Cigarettes     Smokeless tobacco: Never Used   Substance Use Topics     Alcohol use: Not Currently     Comment: No alcohol for a week     If you drink alcohol do you typically have >3 drinks per day or >7 drinks per week? No    Alcohol Use 9/27/2021   Prescreen: >3 drinks/day or >7 drinks/week? Yes   Prescreen: >3 drinks/day or >7 drinks/week? -   AUDIT SCORE  7     Reviewed orders with patient. Reviewed health maintenance and updated orders accordingly - Yes    Labs reviewed in EPIC    Reviewed and updated as needed this visit by clinical staff  Tobacco  Allergies  Meds  Problems  Med Hx  Surg Hx  Fam Hx  Soc Hx        Reviewed and updated as needed this visit by Provider  Tobacco  Allergies  Meds  Problems  Med Hx  Surg Hx  Fam Hx           Review of Systems   Constitutional: Positive for chills. Negative for fever.   HENT: Positive for congestion and ear pain. Negative for hearing loss and sore throat.    Eyes: Positive for visual disturbance. Negative for pain.   Respiratory: Negative for cough and shortness of breath.    Cardiovascular: Negative for chest pain, palpitations and peripheral edema.   Gastrointestinal: Negative for abdominal pain, constipation, diarrhea, heartburn, hematochezia and nausea.   Genitourinary: Negative for discharge, dysuria, frequency, genital sores, hematuria, impotence and urgency.   Musculoskeletal: Negative for arthralgias, joint swelling and myalgias.   Skin: Negative for rash.   Neurological: Positive for dizziness. Negative for paresthesias.   Psychiatric/Behavioral: Positive for mood changes. The patient is not nervous/anxious.      OBJECTIVE:   /72   Pulse 82   Temp 98.7  F (37.1  C) (Tympanic)   Ht  "1.816 m (5' 11.5\")   Wt 83.7 kg (184 lb 8 oz)   SpO2 99%   BMI 25.37 kg/m      Physical Exam  GENERAL: Alert and in no distress.  EYES: Conjunctivae/corneas clear. EOMs grossly intact  HENT: NC/AT, facies symmetric. External ear canals clear without erythema or cerumen. No purulence seen behind either TM.  RESP: CTAB. No w/r/r.  CV: RRR, no m/r/g.  GI: NT, ND, without rebound or guarding, no CVA tenderness  MSK: Moves all four extremities freely.  SKIN: No significant ulcers, lesions or rashes on the visualized portions of the skin  NEURO: Alert. Oriented.  PSYCH: Linear thought process. Speech normal rate and volume. No tangential thoughts, hallucinations, or delusions.    Diagnostic Test Results: Labs reviewed in [facility]    ASSESSMENT/PLAN:   (Z00.00) Routine general medical examination at a health care facility  (primary encounter diagnosis)  Reviewed PMH. Discussed healthcare maintenance issues, including cancer screenings (UTD), relevant immunizations, and cardiac risk factor screenings such as for cholesterol, HTN, and DM.    (F41.1) SANDEE (generalized anxiety disorder)  Improved since recent ER visit. PHQ-9 concerning today. He continues to work with his insurance coverage regarding therapy. He does feel better today than he did in ER. Denies SI or self-harm today. Continue current meds and cares.    (H69.82) Dysfunction of left eustachian tube  Discussed anatomy and pathophysiology of this condition. Recommended trial of intranasal corticosteroids.  - fluticasone (FLONASE) 50 MCG/ACT nasal spray    (R42) Dizziness  DDX includes eustachian tube dysfunction (as above) vs med side effect (he prefers to continue meds given severity of mood disorder and monitor to see if this dizziness improves) vs other. Will check basic labs as below. Continue to monitor.  - Comprehensive metabolic panel  - CBC with platelets  - TSH with free T4 reflex    (E78.5) Dyslipidemia  Seen on past lipid panel.  - Lipid panel reflex to " "direct LDL Non-fasting    (Z23) Encounter for immunization  - INFLUENZA QUAD, RECOMBINANT, P-FREE (RIV4) (FLUBLOK)    Patient has been advised of split billing requirements and indicates understanding: Yes     COUNSELING:   Special attention given to:        Regular exercise       Healthy diet/nutrition       Immunizations    Vaccinated for: Influenza         Colon cancer screening    Estimated body mass index is 25.37 kg/m  as calculated from the following:    Height as of this encounter: 1.816 m (5' 11.5\").    Weight as of this encounter: 83.7 kg (184 lb 8 oz).    He reports that he has been smoking cigarettes. He has never used smokeless tobacco.    Braxton Gonzalez MD  [facility]  "

## 2021-09-27 NOTE — PATIENT INSTRUCTIONS
- I will send you a message on [employer]DoneThis when I am able to look at the results of your tests from today  - Let me know if you desire a mental health referral in the future

## 2021-09-27 NOTE — LETTER
[facility]  [address] [address]  Phone: [phone]   9/27/2021      Calvin Archibald  [address] [facility] [address]        Dear Mr. Calvin Archibald:    I am writing to inform you of the results of the laboratory tests you had done recently. Your blood counts and thyroid function are normal. Your comprehensive metabolic panel (which looks at your liver, kidneys, electrolytes, and glucose) is largely normal. We should continue to monitor your kidney function yearly. Your cholesterol panel is elevated, but not to a level where I would recommend starting a medication to lower it at this point given your otherwise relative good health.    Thank you for allowing me to participate in your care. If you have any further questions or problems, please contact me via our nurse line at [phone]. An even easier way to get ahold of myself or my team is through [employer], which you can sign up for at [email or web address] using your personalized activation code [email or web address]. [employer] is not only a great way to communicate with us, but also allows you to see your full results.      Sincerely,        Braxton Gonzalez MD, MPH  Department of Internal Medicine  [facility]

## 2021-09-28 ASSESSMENT — PATIENT HEALTH QUESTIONNAIRE - PHQ9: SUM OF ALL RESPONSES TO PHQ QUESTIONS 1-9: 12

## 2021-10-06 LAB
ALBUMIN SERPL-MCNC: 3.8 G/DL (ref 3.4–5)
ALP SERPL-CCNC: 60 U/L (ref 40–150)
ALT SERPL W P-5'-P-CCNC: 34 U/L (ref 0–70)
ANION GAP SERPL CALCULATED.3IONS-SCNC: 2 MMOL/L (ref 3–14)
AST SERPL W P-5'-P-CCNC: 19 U/L (ref 0–45)
BILIRUB SERPL-MCNC: 0.3 MG/DL (ref 0.2–1.3)
BUN SERPL-MCNC: 15 MG/DL (ref 7–30)
CALCIUM SERPL-MCNC: 9.6 MG/DL (ref 8.5–10.1)
CHLORIDE BLD-SCNC: 104 MMOL/L (ref 94–109)
CO2 SERPL-SCNC: 28 MMOL/L (ref 20–32)
CREAT SERPL-MCNC: 1.36 MG/DL (ref 0.66–1.25)
GFR SERPL CREATININE-BSD FRML MDRD: 59 ML/MIN/1.73M2
GLUCOSE BLD-MCNC: 79 MG/DL (ref 70–99)
POTASSIUM BLD-SCNC: 3.9 MMOL/L (ref 3.4–5.3)
PROT SERPL-MCNC: 7.8 G/DL (ref 6.8–8.8)
SODIUM SERPL-SCNC: 134 MMOL/L (ref 133–144)

## 2024-01-14 ENCOUNTER — HEALTH MAINTENANCE LETTER (OUTPATIENT)
Age: 54
End: 2024-01-14